# Patient Record
Sex: MALE | Race: WHITE
[De-identification: names, ages, dates, MRNs, and addresses within clinical notes are randomized per-mention and may not be internally consistent; named-entity substitution may affect disease eponyms.]

---

## 2021-09-27 ENCOUNTER — HOSPITAL ENCOUNTER (EMERGENCY)
Dept: HOSPITAL 43 - DL.ED | Age: 31
Discharge: HOME | End: 2021-09-27
Payer: SELF-PAY

## 2021-09-27 DIAGNOSIS — N20.0: Primary | ICD-10-CM

## 2021-09-27 LAB
AMPHET UR QL SCN: NEGATIVE
AMPHET UR QL SCN: NEGATIVE
AMPHETAMINES UR QL SCN>500 NG/ML: NEGATIVE
ANION GAP SERPL CALC-SCNC: 11.7 MEQ/L (ref 7–13)
BARBITURATES UR QL SCN: NEGATIVE
CHLORIDE SERPL-SCNC: 103 MMOL/L (ref 98–107)
MDMA UR QL SCN: NEGATIVE
OXYCODONE UR QL SCN: NEGATIVE
PCP UR QL SCN: NEGATIVE
SODIUM SERPL-SCNC: 137 MMOL/L (ref 136–145)
SP GR UR STRIP: >= 1.03 (ref 1–1.03)
TRICYCLICS UR QL SCN: NEGATIVE

## 2021-09-27 PROCEDURE — 81001 URINALYSIS AUTO W/SCOPE: CPT

## 2021-09-27 PROCEDURE — 36415 COLL VENOUS BLD VENIPUNCTURE: CPT

## 2021-09-27 PROCEDURE — 96374 THER/PROPH/DIAG INJ IV PUSH: CPT

## 2021-09-27 PROCEDURE — 74176 CT ABD & PELVIS W/O CONTRAST: CPT

## 2021-09-27 PROCEDURE — 85025 COMPLETE CBC W/AUTO DIFF WBC: CPT

## 2021-09-27 PROCEDURE — 80053 COMPREHEN METABOLIC PANEL: CPT

## 2021-09-27 PROCEDURE — 99284 EMERGENCY DEPT VISIT MOD MDM: CPT

## 2021-09-27 PROCEDURE — 80305 DRUG TEST PRSMV DIR OPT OBS: CPT

## 2021-09-27 PROCEDURE — 86140 C-REACTIVE PROTEIN: CPT

## 2021-09-27 NOTE — EDM.PDOC
ED HPI GENERAL MEDICAL PROBLEM





- General


Chief Complaint: Flank Pain


Stated Complaint: SEVERE PAIN IN LOWER RT ABDOMIN SPREADING TO BACK


Time Seen by Provider: 21 01:30


Source of Information: Reports: Patient, RN, RN Notes Reviewed


History Limitations: Reports: No Limitations





- History of Present Illness


INITIAL COMMENTS - FREE TEXT/NARRATIVE: 


Sylvain is a 32 y/o male who presents to the ED via personal vehicle with 

complaints of right flank pain.  The patient notes the pain began abruptly two 

hours prior to his arrival to this facility.  He states the right flank pain w

raps around to his right anterior lower abdomen.  Additionally, he notes dysuria

and inability to fully void.  The patient denies fever, shaking chills, nausea, 

vomiting, dyspepsia, hematuria, constipation, or diarrhea.  He has taken no 

medications for his symptoms.  He denies a history of kidney stones.  





  ** Right Flank


Pain Score (Numeric/FACES): 10





- Related Data


                                    Allergies











Allergy/AdvReac Type Severity Reaction Status Date / Time


 


No Known Allergies Allergy   Verified 21 01:25














Past Medical History





- Past Health History


Medical/Surgical History: Denies Medical/Surgical History


HEENT History: Reports: None


Cardiovascular History: Reports: None


Respiratory History: Reports: None


Gastrointestinal History: Reports: None


Genitourinary History: Reports: None


Musculoskeletal History: Reports: None


Neurological History: Reports: None


Endocrine/Metabolic History: Reports: None


Hematologic History: Reports: None





- Infectious Disease History


Infectious Disease History: Reports: None





Social & Family History





- Family History


Family Medical History: No Pertinent Family History





- Tobacco Use


Tobacco Use Status *Q: Never Tobacco User





- Caffeine Use


Caffeine Use: Reports: Coffee





- Recreational Drug Use


Recreational Drug Use: No





ED ROS GENERAL





- Review of Systems


Review Of Systems: Comprehensive ROS is negative, except as noted in HPI.





ED EXAM, RENAL/





- Physical Exam


Exam: See Below


Exam Limited By: No Limitations


General Appearance: Alert, Moderate Distress (Pain to right flank)


Eye Exam: Bilateral Eye: EOMI, Normal Inspection


Ears: Normal External Exam, Hearing Grossly Normal


Nose: Normal Inspection, Normal Mucosa, No Blood


Throat/Mouth: Normal Inspection, Normal Oropharynx, Normal Voice, No Airway 

Compromise


Head: Atraumatic, Normocephalic


Neck: Normal Inspection, Full Range of Motion


Respiratory/Chest: No Respiratory Distress, Lungs Clear, Normal Breath Sounds, 

No Accessory Muscle Use, Chest Non-Tender.  No: Crackles, Rales, Rhonchi, 

Wheezing, Stridor


Cardiovascular: Normal Peripheral Pulses, Regular Rate, Rhythm, No Edema, No 

Gallop, No JVD, No Murmur, No Rub


GI/Abdominal: Soft, No Distention, No Abnormal Bruit, No Mass, Pelvis Stable, 

Guarding, Tender (To right lower quadrant pain), Abnormal Bowel Sounds 

(Hypoactive bowel sounds).  No: Rigid, Rebound


 (Male) Exam: Deferred


Rectal (Males) Exam: Deferred


Back Exam: Full Range of Motion, CVA Tenderness (R).  No: CVA Tenderness (L), 

Muscle Spasm, Paraspinal Tenderness, Vertebral Tenderness


Extremities: Normal Inspection, Normal Range of Motion, Normal Capillary Refill


Neurological: Alert, Oriented, CN II-XII Intact, Normal Cognition, Normal Gait, 

No Motor/Sensory Deficits


Psychiatric: Normal Affect, Normal Mood


Skin Exam: Warm, Dry, Intact, Normal Color, No Rash.  No: Cyanosis, Jaundice, 

Mottled, Pallor


Lymphatic: No Adenopathy





Course





- Vital Signs


Last Recorded V/S: 


                                Last Vital Signs











Temp  98.4 F   21 03:46


 


Pulse  94   21 03:46


 


Resp  14   21 03:46


 


BP  130/77   21 03:46


 


Pulse Ox  96   21 03:46














- Orders/Labs/Meds


Labs: 


                                Laboratory Tests











  21 Range/Units





  01:02 01:02 01:44 


 


WBC    11.0 H  (5.0-10.0)  10^3/uL


 


RBC    4.65  (4.6-6.2)  10^6/uL


 


Hgb    14.9  (14.0-18.0)  g/dL


 


Hct    42.3  (40.0-54.0)  %


 


MCV    91.0  ()  fL


 


MCH    32.0  (27.0-34.0)  pg


 


MCHC    35.2 H  (33.0-35.0)  g/dL


 


Plt Count    285  (150-450)  10^3/uL


 


Neut % (Auto)    84.2 H  (42.2-75.2)  %


 


Lymph % (Auto)    10.6 L  (20.5-50.1)  %


 


Mono % (Auto)    4.5  (2-8)  %


 


Eos % (Auto)    0.5 L  (1.0-3.0)  %


 


Baso % (Auto)    0.2  (0.0-1.0)  %


 


Sodium     (136-145)  mmol/L


 


Potassium     (3.5-5.1)  mmol/L


 


Chloride     ()  mmol/L


 


Carbon Dioxide     (21-32)  mmol/L


 


Anion Gap     (7-13)  mEq/L


 


BUN     (7-18)  mg/dL


 


Creatinine     (0.70-1.30)  mg/dL


 


Est Cr Clr Drug Dosing     mL/min


 


Estimated GFR (MDRD)     


 


BUN/Creatinine Ratio     (No establ ref range)  


 


Glucose     (70-99)  mg/dL


 


Calcium     (8.5-10.1)  mg/dL


 


Total Bilirubin     (0.2-1.0)  mg/dL


 


AST     (15-37)  U/L


 


ALT     (16-63)  U/L


 


Alkaline Phosphatase     ()  U/L


 


C-Reactive Protein     (0.0-0.9)  mg/dL


 


Total Protein     (6.4-8.2)  g/dL


 


Albumin     (3.4-5.0)  g/dL


 


Globulin     


 


Albumin/Globulin Ratio     


 


Urine Color  Other    (YELLOW)  


 


Urine Appearance  Slightly cloudy    (CLEAR)  


 


Urine pH  5.5    (5.0-9.0)  


 


Ur Specific Gravity  >= 1.030    (1.005-1.030)  


 


Urine Protein  30 H    (NEGATIVE)  


 


Urine Glucose (UA)  Negative    (NEGATIVE)  


 


Urine Ketones  15 H    (NEGATIVE)  


 


Urine Occult Blood  Large H    (NEGATIVE)  


 


Urine Nitrite  Negative    (NEGATIVE)  


 


Urine Bilirubin  Small H    (NEGATIVE)  


 


Urine Urobilinogen  0.2    (0.2-1.0)  mg/dL


 


Ur Leukocyte Esterase  Negative    (NEGATIVE)  


 


Urine RBC  >100 H    (0-5)  /HPF


 


Urine WBC  0-5    (0-5/HPF)  /HPF


 


Ur Epithelial Cells  Rare    (NOT SEEN)  /HPF


 


Amorphous Sediment  Occasional    (NOT SEEN)  /HPF


 


Urine Bacteria  Occasional    (0-FEW/HPF)  /HPF


 


Urine Mucus  Few H    (NOT SEEN)  /LPF


 


Urine Opiates Screen   Negative   (NEGATIVE)  


 


Ur Oxycodone Screen   Negative   (NEGATIVE)  


 


Urine Methadone Screen   Negative   (NEGATIVE)  


 


Ur Barbiturates Screen   Negative   (NEGATIVE)  


 


U Tricyclic Antidepress   Negative   (NEGATIVE)  


 


Ur Phencyclidine Scrn   Negative   (NEGATIVE)  


 


Ur Amphetamine Screen   Negative   (NEGATIVE)  


 


U Methamphetamines Scrn   Negative   (NEGATIVE)  


 


Urine MDMA Screen   Negative   (NEGATIVE)  


 


U Benzodiazepines Scrn   Negative   (NEGATIVE)  


 


Urine Cocaine Screen   Negative   (NEGATIVE)  


 


U Marijuana (THC) Screen   Negative   (NEGATIVE)  














  21 Range/Units





  01:44 


 


WBC   (5.0-10.0)  10^3/uL


 


RBC   (4.6-6.2)  10^6/uL


 


Hgb   (14.0-18.0)  g/dL


 


Hct   (40.0-54.0)  %


 


MCV   ()  fL


 


MCH   (27.0-34.0)  pg


 


MCHC   (33.0-35.0)  g/dL


 


Plt Count   (150-450)  10^3/uL


 


Neut % (Auto)   (42.2-75.2)  %


 


Lymph % (Auto)   (20.5-50.1)  %


 


Mono % (Auto)   (2-8)  %


 


Eos % (Auto)   (1.0-3.0)  %


 


Baso % (Auto)   (0.0-1.0)  %


 


Sodium  137  (136-145)  mmol/L


 


Potassium  3.7  (3.5-5.1)  mmol/L


 


Chloride  103  ()  mmol/L


 


Carbon Dioxide  26  (21-32)  mmol/L


 


Anion Gap  11.7  (7-13)  mEq/L


 


BUN  18  (7-18)  mg/dL


 


Creatinine  1.35 H  (0.70-1.30)  mg/dL


 


Est Cr Clr Drug Dosing  91.33  mL/min


 


Estimated GFR (MDRD)  > 60  


 


BUN/Creatinine Ratio  13.3  (No establ ref range)  


 


Glucose  110 H  (70-99)  mg/dL


 


Calcium  8.7  (8.5-10.1)  mg/dL


 


Total Bilirubin  < 0.1 L  (0.2-1.0)  mg/dL


 


AST  19  (15-37)  U/L


 


ALT  37  (16-63)  U/L


 


Alkaline Phosphatase  70  ()  U/L


 


C-Reactive Protein  < 0.2  (0.0-0.9)  mg/dL


 


Total Protein  7.4  (6.4-8.2)  g/dL


 


Albumin  4.0  (3.4-5.0)  g/dL


 


Globulin  3.4  


 


Albumin/Globulin Ratio  1.2  


 


Urine Color   (YELLOW)  


 


Urine Appearance   (CLEAR)  


 


Urine pH   (5.0-9.0)  


 


Ur Specific Gravity   (1.005-1.030)  


 


Urine Protein   (NEGATIVE)  


 


Urine Glucose (UA)   (NEGATIVE)  


 


Urine Ketones   (NEGATIVE)  


 


Urine Occult Blood   (NEGATIVE)  


 


Urine Nitrite   (NEGATIVE)  


 


Urine Bilirubin   (NEGATIVE)  


 


Urine Urobilinogen   (0.2-1.0)  mg/dL


 


Ur Leukocyte Esterase   (NEGATIVE)  


 


Urine RBC   (0-5)  /HPF


 


Urine WBC   (0-5/HPF)  /HPF


 


Ur Epithelial Cells   (NOT SEEN)  /HPF


 


Amorphous Sediment   (NOT SEEN)  /HPF


 


Urine Bacteria   (0-FEW/HPF)  /HPF


 


Urine Mucus   (NOT SEEN)  /LPF


 


Urine Opiates Screen   (NEGATIVE)  


 


Ur Oxycodone Screen   (NEGATIVE)  


 


Urine Methadone Screen   (NEGATIVE)  


 


Ur Barbiturates Screen   (NEGATIVE)  


 


U Tricyclic Antidepress   (NEGATIVE)  


 


Ur Phencyclidine Scrn   (NEGATIVE)  


 


Ur Amphetamine Screen   (NEGATIVE)  


 


U Methamphetamines Scrn   (NEGATIVE)  


 


Urine MDMA Screen   (NEGATIVE)  


 


U Benzodiazepines Scrn   (NEGATIVE)  


 


Urine Cocaine Screen   (NEGATIVE)  


 


U Marijuana (THC) Screen   (NEGATIVE)  











Meds: 


Medications














Discontinued Medications














Generic Name Dose Route Start Last Admin





  Trade Name Adan  PRN Reason Stop Dose Admin


 


Hydromorphone HCl  1 mg  21 01:34  21 01:46





  Hydromorphone 1 Mg/Ml Syringe  IVPUSH  21 01:35  1 mg





  ONETIME ONE   Administration


 


Sodium Chloride  1,000 mls @ 999 mls/hr  21 01:34  21 01:45





  Normal Saline  IV  21 02:34  999 mls/hr





  .BOLUS ONE   Administration


 


Phenazopyridine HCl  95 mg  21 01:34  21 01:49





  Phenazopyridine 95 Mg Tab  PO  21 01:35  95 mg





  ONETIME ONE   Administration


 


Tamsulosin HCl  0.4 mg  21 01:34  21 01:49





  Tamsulosin 0.4 Mg Cap.Er  PO  21 01:35  0.4 mg





  ONETIME ONE   Administration














- Radiology Interpretation


Free Text/Narrative:: 


[facility]


Final Radiology Report  Call: [phone]


assistance Online chat: [email or web address]


Name: SYLVAIN JIMENEZ Age: 31Years M Date: 2021


MRN: [id number] SSN: -- : [id number]


Study: CT ABDOMEN PELVIS WO CONT Requesting Physician: Franca Armstrong


Accession: [id number] Images: [id number]


Addl Studies:


Provided Clinical History: r/o renal stone; R flank pain with sudden onset


Contrast: Without Contrast Medium:


Contrast Amount: Contrast Method:


Page 1 of 2


PROCEDURE INFORMATION:


Exam: CT Abdomen And Pelvis Without Contrast


Exam date and time: 2021 2:14 AM


Age: 31 years old


Clinical indication: Other: Right sided pain; Additional info: R/O renal stone; 

R flank pain with


sudden onset


TECHNIQUE:


Imaging protocol: Computed tomography of the abdomen and pelvis without 

contrast.


Radiation optimization: All CT scans at this facility use at least one of these 

dose optimization


techniques: automated exposure control; mA and/or kV adjustment per patient size

(includes targeted


exams where dose is matched to clinical indication); or iterative 

reconstruction.


COMPARISON:


No relevant prior studies available.


FINDINGS:


Lungs: A small area of parenchymal opacification with an area of pleural 

thickening is noted at the


left lung base. This is of questionable significance.


Pleural spaces: Minimal pleural thickening at the left lung base.


Liver: Normal. No mass.


Gallbladder and bile ducts: Normal. No calcified stones. No ductal dilation.


Pancreas: Normal. No ductal dilation.


Spleen: Normal. No splenomegaly.


Adrenal glands: Normal. No mass.


Kidneys and ureters: The left kidney appears normal. Mild dilatation of the 

right renal collecting


system and ureter are noted secondary to an approximately 4 mm stone that now 

resides with in the


bladder near the right UVJ. No residual stone is identified.


Stomach and bowel: Unremarkable. No obstruction. No mucosal thickening.


Appendix: No evidence of appendicitis.


Intraperitoneal space: Unremarkable. No free air. No significant fluid 

collection.


Vasculature: Unremarkable. No abdominal aortic aneurysm.


Lymph nodes: Unremarkable. No enlarged lymph nodes.


Urinary bladder: Unremarkable as visualized.


Reproductive: Unremarkable as visualized.


Bones/joints: Unremarkable. No acute fracture.


Soft tissues: Unremarkable.


IMPRESSION:


Recently passed right ureteral stone as above. 2. Minimal changes at the left 

lung base of


questionable significance.


Thank you for allowing us to participate in the care of your patient.


Dictated and Authenticated by: Ender Murray MD


2021 3:02 AM Central Time ([place] & [place])








- Re-Assessments/Exams


Free Text/Narrative Re-Assessment/Exam: 





21 


UA performed while patient triaged.





NS 1L bolus, Dilaudid 1mg IVP, and Flomax 0.4mg PO administered while labs 

pending. 





Will obtain CT abdomen/pelvis.  





CT remarkable for 4mm renal stone in the bladder by the UVJ.  Will treat acute 

pain with Ketorolac 10mg and nausea with Zofran ODT 4mg.  Will facilitate 

passing of stone with Flomax 0.4mg.  Red flag signs and symptoms which would 

warrant reevaluation reviewed.  Patient verbalized understanding and agreement 

with the plan of care.











Departure





- Departure


Time of Disposition: 03:18


Disposition: Home, Self-Care 01


Condition: Fair


Clinical Impression: 


 Kidney stone








- Discharge Information


*PRESCRIPTION DRUG MONITORING PROGRAM REVIEWED*: Not Applicable


*COPY OF PRESCRIPTION DRUG MONITORING REPORT IN PATIENT MAYANK: Not Applicable


Instructions:  Kidney Stones


Referrals: 


PCP,None [Primary Care Provider] - 


Forms:  ED Department Discharge


Additional Instructions: 


Rx: Ketorolac


Rx: Flomax


Rx: Zofran ODT





1.) Drink plenty of water to keep hydrated and flush out bladder/kidney.


2.) Eat small, snack-sized meals to avoid nausea. 


3.) You may take acetaminophen (Tylenol) 1000mg every six hours for breakthrough

pain. 


4.) Follow up with your primary care provider, or return to the emergency 

department, with any fever, shaking chills, or inability to urinate. 





Sepsis Event Note (ED)





- Evaluation


Sepsis Screening Result: No Definite Risk

## 2021-09-27 NOTE — CT
PROCEDURE INFORMATION: 

Exam: CT Abdomen And Pelvis Without Contrast 

Exam date and time: 9/27/2021 2:14 AM 

Age: 31 years old 

Clinical indication: Other: Right sided pain; Additional info: R/O renal stone; 

R flank pain with sudden onset 



TECHNIQUE: 

Imaging protocol: Computed tomography of the abdomen and pelvis without 

contrast. 

Radiation optimization: All CT scans at this facility use at least one of these 

dose optimization techniques: automated exposure control; mA and/or kV 

adjustment per patient size (includes targeted exams where dose is matched to 

clinical indication); or iterative reconstruction. 



COMPARISON: 

No relevant prior studies available. 



FINDINGS: 

Lungs:  A small area of parenchymal opacification with an area of pleural 

thickening is noted at the left lung base.  This is of questionable 

significance.

Pleural spaces:  Minimal pleural thickening at the left lung base.



Liver: Normal. No mass. 

Gallbladder and bile ducts: Normal. No calcified stones. No ductal dilation. 

Pancreas: Normal. No ductal dilation. 

Spleen: Normal. No splenomegaly. 

Adrenal glands: Normal. No mass. 

Kidneys and ureters: The left kidney appears normal. Mild dilatation of the 

right renal collecting system and ureter are noted secondary to an 

approximately 4 mm stone that now resides with in the bladder near the right 

UVJ. No residual stone is identified. 

Stomach and bowel: Unremarkable. No obstruction. No mucosal thickening. 

Appendix: No evidence of appendicitis. 



Intraperitoneal space: Unremarkable. No free air. No significant fluid 

collection. 

Vasculature: Unremarkable. No abdominal aortic aneurysm. 

Lymph nodes: Unremarkable. No enlarged lymph nodes. 

Urinary bladder: Unremarkable as visualized. 

Reproductive: Unremarkable as visualized. 

Bones/joints: Unremarkable. No acute fracture. 

Soft tissues: Unremarkable. 



IMPRESSION: 

Recently passed right ureteral stone as above.  2.  Minimal changes at the left 

lung base of questionable significance.

.